# Patient Record
Sex: MALE | Race: WHITE | NOT HISPANIC OR LATINO | URBAN - METROPOLITAN AREA
[De-identification: names, ages, dates, MRNs, and addresses within clinical notes are randomized per-mention and may not be internally consistent; named-entity substitution may affect disease eponyms.]

---

## 2021-05-27 ENCOUNTER — OFFICE (OUTPATIENT)
Dept: URBAN - METROPOLITAN AREA CLINIC 14 | Facility: CLINIC | Age: 46
End: 2021-05-27

## 2021-05-27 VITALS
HEIGHT: 68 IN | WEIGHT: 250 LBS | SYSTOLIC BLOOD PRESSURE: 123 MMHG | HEART RATE: 78 BPM | OXYGEN SATURATION: 96 % | DIASTOLIC BLOOD PRESSURE: 63 MMHG

## 2021-05-27 DIAGNOSIS — R10.10 UPPER ABDOMINAL PAIN, UNSPECIFIED: ICD-10-CM

## 2021-05-27 DIAGNOSIS — K21.9 GASTRO-ESOPHAGEAL REFLUX DISEASE WITHOUT ESOPHAGITIS: ICD-10-CM

## 2021-05-27 DIAGNOSIS — R19.4 CHANGE IN BOWEL HABIT: ICD-10-CM

## 2021-05-27 DIAGNOSIS — R19.7 DIARRHEA, UNSPECIFIED: ICD-10-CM

## 2021-05-27 PROCEDURE — 99204 OFFICE O/P NEW MOD 45 MIN: CPT | Performed by: INTERNAL MEDICINE

## 2021-05-27 RX ORDER — OMEPRAZOLE 40 MG/1
40 CAPSULE, DELAYED RELEASE ORAL
Qty: 30 | Refills: 11 | Status: COMPLETED
Start: 2021-05-27 | End: 2021-09-27

## 2021-05-27 RX ORDER — HYOSCYAMINE SULFATE 0.12 MG/1
TABLET ORAL; SUBLINGUAL
Qty: 90 | Refills: 3 | Status: COMPLETED
Start: 2021-05-27 | End: 2021-09-27

## 2021-05-27 NOTE — SERVICENOTES
The cause of the patient's symptoms is uncertain at this time.  Given the relative acute change over the past 2-3 months this could be related to infection, however this could also be a flare of inflammatory bowel disease or IBS.  His abdominal pain does appear to be related to his change in bowel habits so we will check stool studies as above.  He apparently has had negative imaging and blood work we will try to obtain these records from [facility].  His symptoms are similar to what he had around seven years ago but they apparently resolved on their own.  Because of his new issues we will go ahead and proceed with EGD and colonoscopy as above.  Will also give a trial of an antispasmodic and trial of a PPI.  He was instructed to notify us if he has any new or worsening symptoms in the meantime.  Also recommended that he watch his diet, stay away from fatty food, dairy, etc

## 2021-05-27 NOTE — SERVICEHPINOTES
Mr. Esteves is a 45-year-old man here for evaluation abdominal pain, longstanding reflux, and new diarrhea. The patient was seen by me initially in August 2014 for some similar symptoms. At that time he underwent basic blood work which revealed normal amylase, CBC, CMP, CRP, lipase, TSH, and celiac labs. We did recommend stool studies as well as EGD and colonoscopy with the patient never followed through and was lost to follow-up. He now comes in stating that he had she has done well since then until 2-3 months ago when he started having some issues with some change in bowel habits with diarrhea. He now has 3-4 bowel movements a day which are loose and occasionally watery. He denies any rectal bleeding. He does note that his diarrhea is associated with some upper abdominal pain. The pain is a dull pain that comes and goes. It is usually better after bowel movement. He denies any prefer that makes his pain worse. He states that because of some worsening upper abdominal pain he went to the hospital [facility] apparently had negative evaluation of his gallbladder and negative CT scan as well as normal labs. He was not given any medications. He does have chronic reflux for which he takes over-the-counter antacid medicine on an intermittent basis. He denies any dysphagia. He denies any fevers, chills, nausea, or vomiting. There is no first-degree family history of colon cancer, colon polyps, or inflammatory bowel disease. He has not had any heart disease and denies any chest pain or shortness of breath. He denies any recent change in his diet, antibiotics, or any prescription medications.

## 2021-06-01 ENCOUNTER — OFFICE (OUTPATIENT)
Dept: URBAN - METROPOLITAN AREA CLINIC 11 | Facility: CLINIC | Age: 46
End: 2021-06-01

## 2021-06-01 LAB
CALPROTECTIN, FECAL: 39 UG/G (ref 0–120)
LACTOFERRIN, FECAL, QUANT.: 1.18 UG/ML(G) (ref 0–7.24)

## 2021-06-24 ENCOUNTER — AMBULATORY SURGICAL CENTER (OUTPATIENT)
Dept: URBAN - METROPOLITAN AREA SURGERY 2 | Facility: SURGERY | Age: 46
End: 2021-06-24

## 2021-06-24 ENCOUNTER — OFFICE (OUTPATIENT)
Dept: URBAN - METROPOLITAN AREA PATHOLOGY 22 | Facility: PATHOLOGY | Age: 46
End: 2021-06-24

## 2021-06-24 ENCOUNTER — AMBULATORY SURGICAL CENTER (OUTPATIENT)
Dept: URBAN - METROPOLITAN AREA SURGERY 2 | Facility: SURGERY | Age: 46
End: 2021-06-24
Payer: COMMERCIAL

## 2021-06-24 VITALS
HEART RATE: 61 BPM | RESPIRATION RATE: 18 BRPM | RESPIRATION RATE: 16 BRPM | SYSTOLIC BLOOD PRESSURE: 126 MMHG | WEIGHT: 240 LBS | WEIGHT: 240 LBS | SYSTOLIC BLOOD PRESSURE: 101 MMHG | HEART RATE: 64 BPM | HEART RATE: 63 BPM | DIASTOLIC BLOOD PRESSURE: 50 MMHG | OXYGEN SATURATION: 95 % | HEART RATE: 64 BPM | SYSTOLIC BLOOD PRESSURE: 95 MMHG | DIASTOLIC BLOOD PRESSURE: 42 MMHG | HEIGHT: 68 IN | DIASTOLIC BLOOD PRESSURE: 45 MMHG | WEIGHT: 240 LBS | DIASTOLIC BLOOD PRESSURE: 45 MMHG | SYSTOLIC BLOOD PRESSURE: 95 MMHG | HEART RATE: 67 BPM | DIASTOLIC BLOOD PRESSURE: 70 MMHG | DIASTOLIC BLOOD PRESSURE: 55 MMHG | TEMPERATURE: 98.1 F | SYSTOLIC BLOOD PRESSURE: 94 MMHG | DIASTOLIC BLOOD PRESSURE: 55 MMHG | OXYGEN SATURATION: 93 % | SYSTOLIC BLOOD PRESSURE: 95 MMHG | DIASTOLIC BLOOD PRESSURE: 45 MMHG | HEART RATE: 64 BPM | SYSTOLIC BLOOD PRESSURE: 101 MMHG | OXYGEN SATURATION: 93 % | OXYGEN SATURATION: 95 % | HEIGHT: 68 IN | DIASTOLIC BLOOD PRESSURE: 70 MMHG | HEART RATE: 63 BPM | SYSTOLIC BLOOD PRESSURE: 126 MMHG | RESPIRATION RATE: 16 BRPM | HEART RATE: 63 BPM | DIASTOLIC BLOOD PRESSURE: 42 MMHG | HEIGHT: 68 IN | OXYGEN SATURATION: 93 % | OXYGEN SATURATION: 95 % | RESPIRATION RATE: 16 BRPM | DIASTOLIC BLOOD PRESSURE: 50 MMHG | HEART RATE: 67 BPM | TEMPERATURE: 98.1 F | RESPIRATION RATE: 18 BRPM | OXYGEN SATURATION: 96 % | OXYGEN SATURATION: 96 % | SYSTOLIC BLOOD PRESSURE: 101 MMHG | DIASTOLIC BLOOD PRESSURE: 70 MMHG | SYSTOLIC BLOOD PRESSURE: 94 MMHG | DIASTOLIC BLOOD PRESSURE: 50 MMHG | RESPIRATION RATE: 18 BRPM | OXYGEN SATURATION: 96 % | SYSTOLIC BLOOD PRESSURE: 94 MMHG | DIASTOLIC BLOOD PRESSURE: 42 MMHG | HEART RATE: 67 BPM | HEART RATE: 61 BPM | HEART RATE: 61 BPM | SYSTOLIC BLOOD PRESSURE: 126 MMHG | TEMPERATURE: 98.1 F | DIASTOLIC BLOOD PRESSURE: 55 MMHG

## 2021-06-24 DIAGNOSIS — D12.3 BENIGN NEOPLASM OF TRANSVERSE COLON: ICD-10-CM

## 2021-06-24 DIAGNOSIS — D12.2 BENIGN NEOPLASM OF ASCENDING COLON: ICD-10-CM

## 2021-06-24 DIAGNOSIS — R19.7 DIARRHEA, UNSPECIFIED: ICD-10-CM

## 2021-06-24 DIAGNOSIS — K22.70 BARRETT'S ESOPHAGUS WITHOUT DYSPLASIA: ICD-10-CM

## 2021-06-24 DIAGNOSIS — K21.9 GASTRO-ESOPHAGEAL REFLUX DISEASE WITHOUT ESOPHAGITIS: ICD-10-CM

## 2021-06-24 PROBLEM — K52.89 CLINICALLY SIGNIFICANT DIARRHEA OF UNEXPLAINED ORIGIN: Status: ACTIVE | Noted: 2021-06-24

## 2021-06-24 PROBLEM — K64.1 SECOND DEGREE HEMORRHOIDS: Status: ACTIVE | Noted: 2021-06-24

## 2021-06-24 PROBLEM — K22.2 ESOPHAGEAL OBSTRUCTION: Status: ACTIVE | Noted: 2021-06-24

## 2021-06-24 PROCEDURE — 45380 COLONOSCOPY AND BIOPSY: CPT | Mod: 59 | Performed by: INTERNAL MEDICINE

## 2021-06-24 PROCEDURE — 88305 TISSUE EXAM BY PATHOLOGIST: CPT | Performed by: INTERNAL MEDICINE

## 2021-06-24 PROCEDURE — 43239 EGD BIOPSY SINGLE/MULTIPLE: CPT | Mod: 51 | Performed by: INTERNAL MEDICINE

## 2021-06-24 PROCEDURE — 88313 SPECIAL STAINS GROUP 2: CPT | Performed by: INTERNAL MEDICINE

## 2021-06-24 PROCEDURE — 88342 IMHCHEM/IMCYTCHM 1ST ANTB: CPT | Performed by: INTERNAL MEDICINE

## 2021-06-24 PROCEDURE — 45385 COLONOSCOPY W/LESION REMOVAL: CPT | Performed by: INTERNAL MEDICINE

## 2021-09-27 ENCOUNTER — OFFICE (OUTPATIENT)
Dept: URBAN - METROPOLITAN AREA CLINIC 11 | Facility: CLINIC | Age: 46
End: 2021-09-27

## 2021-09-27 VITALS
WEIGHT: 249 LBS | HEART RATE: 68 BPM | HEIGHT: 68 IN | SYSTOLIC BLOOD PRESSURE: 130 MMHG | OXYGEN SATURATION: 97 % | DIASTOLIC BLOOD PRESSURE: 65 MMHG

## 2021-09-27 DIAGNOSIS — K58.0 IRRITABLE BOWEL SYNDROME WITH DIARRHEA: ICD-10-CM

## 2021-09-27 DIAGNOSIS — K21.9 GASTRO-ESOPHAGEAL REFLUX DISEASE WITHOUT ESOPHAGITIS: ICD-10-CM

## 2021-09-27 DIAGNOSIS — K63.5 POLYP OF COLON: ICD-10-CM

## 2021-09-27 DIAGNOSIS — K22.70 BARRETT'S ESOPHAGUS WITHOUT DYSPLASIA: ICD-10-CM

## 2021-09-27 PROCEDURE — 99213 OFFICE O/P EST LOW 20 MIN: CPT | Performed by: INTERNAL MEDICINE

## 2021-09-27 RX ORDER — OMEPRAZOLE 40 MG/1
40 CAPSULE, DELAYED RELEASE ORAL
Qty: 30 | Refills: 11 | Status: ACTIVE
Start: 2021-09-27

## 2021-09-27 NOTE — SERVICENOTES
The patient appears to have IBS with diarrhea which is well controlled on diet modification but is worse with greasy food.  At this point he is not interested in taking any medications for this since he can control dietary modification but he was instructed to call back if he starts having worsening diarrhea which time we can consider trial of colestipol or Xifaxan.  I did explain to the patient that he needs to stay on his PPI on a daily basis due to his Short's esophagus and reflux.  We also discussed the need for weight loss and dietary modification.  We will prescribe omeprazole for him to see how this works for him.  His next surveillance EGD is due in three years.

## 2021-09-27 NOTE — SERVICEHPINOTES
Mr. Esteves is a 45-year-old man here for follow up. Since his procedures he has done very well. He has been taking Nexium on a more regular basis which controls his reflux. He states that if he does miss a dose for some reason that will lead to some more reflux. He also states that his diarrhea is under much better control with dietary modification but does admit that if he eats something fatty or greasy it will lead to diarrhea which only occurs on a couple of occasions and then goes away. He has not taken anything for this nor does he wished to take anything for this. He otherwise denies any fevers, chills, rectal bleeding, nausea, vomiting, or dysphagia.BRPrevious GI History:BRThe patient was seen by me initially in August 2014 for some similar symptoms. At that time he underwent basic blood work which revealed normal amylase, CBC, CMP, CRP, lipase, TSH, and celiac labs. We did recommend stool studies as well as EGD and colonoscopy with the patient never followed through and was lost to follow-up. He then returned in May 2021 to see us because of worsening diarrhea and indigestion. Because of some upper abdominal pain he went to [facility] where he had negative CT scan and normal labs. Because the symptoms we did check lactoferrin and calprotectin which were both negative. He underwent EGD and colonoscopy by me in June 2021 or EGD did reveal a diminutive Schatzki ring with some localized erythema which was biopsied and was consistent with Short's esophagus without dysplasia. Colonoscopy was normal to the terminal ileum with normal random colon biopsies. Two adenomatous polyps were removed and so his next colonoscopy is due in 2028.

## 2022-10-07 ENCOUNTER — OFFICE (OUTPATIENT)
Dept: URBAN - METROPOLITAN AREA CLINIC 11 | Facility: CLINIC | Age: 47
End: 2022-10-07

## 2025-07-30 ENCOUNTER — OFFICE (OUTPATIENT)
Dept: URBAN - METROPOLITAN AREA CLINIC 11 | Facility: CLINIC | Age: 50
End: 2025-07-30

## 2025-07-30 VITALS
OXYGEN SATURATION: 95 % | HEART RATE: 63 BPM | SYSTOLIC BLOOD PRESSURE: 123 MMHG | HEIGHT: 68 IN | DIASTOLIC BLOOD PRESSURE: 70 MMHG | WEIGHT: 245 LBS

## 2025-07-30 DIAGNOSIS — K22.70 BARRETT'S ESOPHAGUS WITHOUT DYSPLASIA: ICD-10-CM

## 2025-07-30 PROCEDURE — 99205 OFFICE O/P NEW HI 60 MIN: CPT

## 2025-07-30 RX ORDER — PANTOPRAZOLE SODIUM 40 MG/1
40 TABLET, DELAYED RELEASE ORAL
Qty: 90 | Refills: 3 | Status: ACTIVE
Start: 2025-07-30